# Patient Record
Sex: MALE | Race: BLACK OR AFRICAN AMERICAN
[De-identification: names, ages, dates, MRNs, and addresses within clinical notes are randomized per-mention and may not be internally consistent; named-entity substitution may affect disease eponyms.]

---

## 2018-01-01 ENCOUNTER — HOSPITAL ENCOUNTER (EMERGENCY)
Dept: HOSPITAL 63 - ER | Age: 28
Discharge: HOME | End: 2018-01-01
Payer: COMMERCIAL

## 2018-01-01 VITALS — BODY MASS INDEX: 35.34 KG/M2 | HEIGHT: 60 IN | WEIGHT: 180 LBS

## 2018-01-01 VITALS — SYSTOLIC BLOOD PRESSURE: 160 MMHG | DIASTOLIC BLOOD PRESSURE: 87 MMHG

## 2018-01-01 DIAGNOSIS — J02.9: Primary | ICD-10-CM

## 2018-01-01 PROCEDURE — 99284 EMERGENCY DEPT VISIT MOD MDM: CPT

## 2018-01-01 PROCEDURE — 93005 ELECTROCARDIOGRAM TRACING: CPT

## 2018-01-01 PROCEDURE — 96372 THER/PROPH/DIAG INJ SC/IM: CPT

## 2018-01-01 PROCEDURE — 96374 THER/PROPH/DIAG INJ IV PUSH: CPT

## 2018-01-01 NOTE — PHYS DOC
Past History


Past Medical History:  No Pertinent History


Past Surgical History:  No Surgical History


Smoking:  Non-smoker


Alcohol Use:  None





Adult General


Chief Complaint


Chief Complaint:  SORE THROAT





HPI


HPI





he is a pleasant 27-year-old otherwise healthy male who is incarcerated as a 

prisoner who comes in today to the ER from the jail facility because of sore 

throat that gotten progressively worse over the last 3 days. Patient describes 

some sore throat with pain across the lower part of his neck with radiation of 

when he says is "" mucus in the chest. He has no cough, subjective fevers and 

chills without change in voice but difficult swallowing secondary to pain. 

Patient denies any chest pain, shortness of breath, abdominal pain other 

symptoms other than the fact he is not minimal sleep well at night secondary to 

the sore throat. He has no myalgias or flulike symptoms and no sick contacts. He

's been utilizing throat lozenges without much improvement





Review of Systems


Review of Systems





Constitutional: subjective fevers and chills nothing measured


Eyes: Denies change in visual acuity, redness, or eye pain []


HENT: Denies nasal congestion but patient does have significant sore throat 

without change in voice


Respiratory: Denies cough or shortness of breath []


Cardiovascular: No additional information not addressed in HPI []


GI: Denies abdominal pain, nausea, vomiting, bloody stools or diarrhea []


: Denies dysuria or hematuria []


Musculoskeletal: Denies back pain or joint pain []


Integument: Denies rash or skin lesions []


Neurologic: Denies headache, focal weakness or sensory changes []





All other systems were reviewed and found to be within normal limits, except as 

documented in this note.





Physical Exam


Physical Exam


Vital signs recorded on the chart patient has a borderline fever


Constitutional: Well developed, well nourished, no acute distress, non-toxic 

appearance. []


HENT: Normocephalic, atraumatic, bilateral external ears normal, oropharynx 

moist, patient has oral exudates and tonsillar hypertrophy left greater than 

right some mild uvular edema, nose normal. []


Eyes: PERRLA, EOMI, conjunctiva normal, no discharge. [] 


Neck: Normal range of motion, no tenderness, supple, no stridor. Anterior 

cervical lymphadenopathy noted[] 


Cardiovascular:Heart rate regular rhythm, no murmur []


Lungs & Thorax:  Bilateral breath sounds clear to auscultation []


Skin: Warm, dry, no erythema, no rash. 


Neurologic: Alert and oriented X 3, normal motor function, normal sensory 

function, no focal deficits noted. No muffled change in voice []


Psychologic: Affect normal, judgement normal, mood normal. []





EKG


EKG


[]Recent EKG timed at 2 AM 01/01/2018 demonstrates heart rate of 100 sinus 

rhythm patient has a MI interval 126 which is normal, QRS width of 70 which is 

normal, QTC of 4 which is normal. Patient is nonseptic T-wave inversion in lead 

3 there is significant movement artifact also in the EKG this is not a normal 

EKG but no ST segment changes consistent with acute coronary stemi





Radiology/Procedures


Radiology/Procedures


[]





Course & Med Decision Making


Course & Med Decision Making


Pertinent Labs and Imaging studies reviewed. (See chart for details)








Patient patient's presentation and the roll below I will treat patient 

empirically as a strep pharyngitis. I will treat him with Decadron, IM Toradol 

and IM Bicillin. Patient was accepting of treatment








Centor criteria: The Centor criteria are a widely used and accepted clinical 

decision tool





These criteria are:





Tonsillar exudates


Tender anterior cervical adenopathy


Fever by history


Absence of cough





The likelihood of having GAS increases with the number of Centor criteria. 

However, the Centor criteria are most useful in identifying patients for whom 

neither microbiologic tests nor antimicrobial therapy are necessary. Patients 

with fewer than three (0 to 2) Centor criteria are unlikely to have GAS and, in 

general, should not receive either antibiotic treatment or diagnostic testing.


[]discharge:





I've spoken with the patient and/or caregivers. I've explained the patient's 

condition, diagnosis and treatment plan based on information available to me at 

this time. I've answered the patient's and/or caregivers questions and 

addressed any concerns. The patient and/or caregivers have a good understanding 

the patient's diagnosis, condition and treatment plan as can be expected at 

this point. Vital signs have been stabilized. The patient's condition is stable 

for discharge from the emergency department.





The patient will pursue further outpatient evaluation with her primary care 

provider or other designated consulting physician as outlined in the discharge 

instructions. Patient and/or caregivers are agreeable to this plan of care and 

follow-up instructions have been explained in detail. The patient and/or 

caregivers have received these instructions in written format and expressed 

understanding of these discharge instructions. The patient and her caregivers 

are aware that if any significant change in condition or worsening of symptoms 

should prompt him to immediately return to this of the closest emergency 

department.  If an emergent department is not readily available I would 

encourage him to call 911.





Carmen Disclaimer


Dragon Disclaimer


This electronic medical record was generated, in whole or in part, using a 

voice recognition dictation system.





Departure


Departure:


Impression:  


 Primary Impression:  


 Pharyngitis


Disposition:  01 HOME, SELF-CARE


Condition:  IMPROVED


Referrals:  


PCP,NO (PCP)


Patient Instructions:  Viral and Bacterial Pharyngitis





Additional Instructions:  


discharge:





I've spoken with the patient and/or caregivers. I've explained the patient's 

condition, diagnosis and treatment plan based on information available to me at 

this time. I've answered the patient's and/or caregivers questions and 

addressed any concerns. The patient and/or caregivers have a good understanding 

the patient's diagnosis, condition and treatment plan as can be expected at 

this point. Vital signs have been stabilized. The patient's condition is stable 

for discharge from the emergency department.





The patient will pursue further outpatient evaluation with her primary care 

provider or other designated consulting physician as outlined in the discharge 

instructions. Patient and/or caregivers are agreeable to this plan of care and 

follow-up instructions have been explained in detail. The patient and/or 

caregivers have received these instructions in written format and expressed 

understanding of these discharge instructions. The patient and her caregivers 

are aware that if any significant change in condition or worsening of symptoms 

should prompt him to immediately return to this of the closest emergency 

department.  If an emergent department is not readily available I would 

encourage him to call 911.











MARILU GOMEZ MD Jan 1, 2018 02:29

## 2018-01-01 NOTE — EKG
[facility] [address], [place] [address]

Test Date:    2018               Test Time:    02:04:59

Pat Name:     LIGIA ESCOBEDO           Department:   

Patient ID:   [id number]           Room:          

Gender:       M                        Technician:   DESIREE

:          1990               Requested By: MARILU GOMEZ

Order Number: [id number]            Reading MD:   Augusto Paul

                                 Measurements

Intervals                              Axis          

Rate:         100                      P:            36

MI:           126                      QRS:          42

QRSD:         78                       T:            -24

QT:           314                                    

QTc:          408                                    

                           Interpretive Statements

SINUS RHYTHM



Electronically Signed On 2018 10:54:18 CST by Augusto Paul